# Patient Record
Sex: FEMALE | Race: WHITE | NOT HISPANIC OR LATINO | URBAN - METROPOLITAN AREA
[De-identification: names, ages, dates, MRNs, and addresses within clinical notes are randomized per-mention and may not be internally consistent; named-entity substitution may affect disease eponyms.]

---

## 2020-07-25 ENCOUNTER — TELEPHONE ENCOUNTER (OUTPATIENT)
Dept: URBAN - METROPOLITAN AREA CLINIC 13 | Facility: CLINIC | Age: 20
End: 2020-07-25

## 2020-07-25 RX ORDER — PANCRELIPASE 24000; 76000; 120000 [USP'U]/1; [USP'U]/1; [USP'U]/1
TAKE 2 CAPSULE 3 TIMES DAILY BEFORE MEALS CAPSULE, DELAYED RELEASE PELLETS ORAL
Refills: 3 | OUTPATIENT
Start: 2018-08-01 | End: 2019-11-26

## 2020-07-25 RX ORDER — URSODIOL 300 MG/1
PHARMACY MADE INTO A LIQUID. SHE TAKES 3.5ML TWICE A DAY CAPSULE ORAL
Refills: 0 | OUTPATIENT
Start: 2007-10-16 | End: 2019-03-20

## 2020-07-25 RX ORDER — FLUTICASONE PROPIONATE 220 UG/1
AEROSOL, METERED RESPIRATORY (INHALATION)
Refills: 0 | OUTPATIENT
Start: 2006-05-10 | End: 2019-03-20

## 2020-07-25 RX ORDER — PANCRELIPASE 60000; 12000; 38000 [USP'U]/1; [USP'U]/1; [USP'U]/1
TAKE TWO CAPSULES BY MOUTH THREE TIMES A DAY CAPSULE, DELAYED RELEASE PELLETS ORAL
Refills: 6 | OUTPATIENT
Start: 2018-08-01 | End: 2019-11-26

## 2020-07-26 ENCOUNTER — TELEPHONE ENCOUNTER (OUTPATIENT)
Dept: URBAN - METROPOLITAN AREA CLINIC 13 | Facility: CLINIC | Age: 20
End: 2020-07-26

## 2020-07-26 RX ORDER — CLINDAMYCIN PALMITATE HYDROCHLORIDE (PEDIATRIC) 75 MG/5ML
SOLUTION ORAL
Qty: 600 | Refills: 0 | Status: ACTIVE | COMMUNITY
Start: 2018-04-26

## 2020-07-26 RX ORDER — LINEZOLID 100 MG/5ML
SUSPENSION ORAL
Qty: 600 | Refills: 0 | Status: ACTIVE | COMMUNITY
Start: 2019-05-20

## 2020-07-26 RX ORDER — MUPIROCIN 20 MG/G
OINTMENT TOPICAL
Qty: 22 | Refills: 0 | Status: ACTIVE | COMMUNITY
Start: 2018-07-12

## 2020-07-26 RX ORDER — HYDROCODONE BITARTRATE AND ACETAMINOPHEN 7.5; 325 MG/1; MG/1
TABLET ORAL
Qty: 20 | Refills: 0 | Status: ACTIVE | COMMUNITY
Start: 2019-03-25

## 2020-07-26 RX ORDER — ALBUTEROL SULFATE 90 UG/1
AEROSOL, METERED RESPIRATORY (INHALATION)
Qty: 8 | Refills: 0 | Status: ACTIVE | COMMUNITY
Start: 2018-10-17

## 2020-07-26 RX ORDER — URINE ACETONE TEST STRIPS
STRIP MISCELLANEOUS
Qty: 200 | Refills: 0 | Status: ACTIVE | COMMUNITY
Start: 2019-04-10

## 2020-07-26 RX ORDER — DROSPIRENONE AND ETHINYL ESTRADIOL 0.03MG-3MG
KIT ORAL
Qty: 28 | Refills: 0 | Status: ACTIVE | COMMUNITY
Start: 2019-01-24

## 2020-07-26 RX ORDER — ALBUTEROL SULFATE 2.5 MG/3ML
SOLUTION RESPIRATORY (INHALATION)
Qty: 75 | Refills: 0 | Status: ACTIVE | COMMUNITY
Start: 2018-05-30

## 2020-07-26 RX ORDER — ADAPALENE 3 MG/G
GEL TOPICAL
Qty: 45 | Refills: 0 | Status: ACTIVE | COMMUNITY
Start: 2018-03-28

## 2020-07-26 RX ORDER — CEFDINIR 250 MG/5ML
POWDER, FOR SUSPENSION ORAL
Qty: 120 | Refills: 0 | Status: ACTIVE | COMMUNITY
Start: 2019-01-30

## 2020-07-26 RX ORDER — SODIUM CHLORIDE FOR INHALATION 3.5 %
USE AS DIRECTED VIAL, NEBULIZER (ML) INHALATION
Refills: 0 | Status: ACTIVE | COMMUNITY

## 2020-07-26 RX ORDER — DOXYCYCLINE 100 MG/1
CAPSULE ORAL
Qty: 28 | Refills: 0 | Status: ACTIVE | COMMUNITY
Start: 2018-05-08

## 2020-07-26 RX ORDER — DROSPIRENONE AND ETHINYL ESTRADIOL 0.03MG-3MG
KIT ORAL
Qty: 28 | Refills: 0 | Status: ACTIVE | COMMUNITY
Start: 2018-04-22

## 2020-07-26 RX ORDER — SODIUM CHLORIDE 9 G/1000ML
INJECTION, SOLUTION INTRAVENOUS
Qty: 12000 | Refills: 0 | Status: ACTIVE | COMMUNITY
Start: 2019-05-07

## 2020-07-26 RX ORDER — LEVOFLOXACIN 750 MG/30ML
INJECTION, SOLUTION, CONCENTRATE INTRAVENOUS
Qty: 220 | Refills: 0 | Status: ACTIVE | COMMUNITY
Start: 2019-07-06

## 2020-07-26 RX ORDER — TOBRAMYCIN 300 MG/5ML
USE AS DIRECTED SOLUTION RESPIRATORY (INHALATION)
Refills: 0 | Status: ACTIVE | COMMUNITY
Start: 2018-03-28

## 2020-07-26 RX ORDER — BLOOD SUGAR DIAGNOSTIC
STRIP MISCELLANEOUS
Qty: 300 | Refills: 0 | Status: ACTIVE | COMMUNITY
Start: 2019-04-10

## 2020-07-26 RX ORDER — PANCRELIPASE 36000; 180000; 114000 [USP'U]/1; [USP'U]/1; [USP'U]/1
TAKE 2 CAPSULE BEFORE MEALS CAPSULE, DELAYED RELEASE PELLETS ORAL
Qty: 180 | Refills: 11 | Status: ACTIVE | COMMUNITY
Start: 2019-11-26

## 2020-07-26 RX ORDER — GLUCAGON 1 MG
KIT INJECTION
Qty: 3 | Refills: 0 | Status: ACTIVE | COMMUNITY
Start: 2018-07-31

## 2020-07-26 RX ORDER — LINEZOLID 600 MG/1
TABLET ORAL
Qty: 28 | Refills: 0 | Status: ACTIVE | COMMUNITY
Start: 2018-10-18

## 2020-07-26 RX ORDER — BLOOD SUGAR DIAGNOSTIC
STRIP MISCELLANEOUS
Qty: 150 | Refills: 0 | Status: ACTIVE | COMMUNITY
Start: 2018-05-14

## 2020-07-26 RX ORDER — BUTALBITAL, ACETAMINOPHEN, AND CAFFEINE 50; 325; 40 MG/1; MG/1; MG/1
TABLET ORAL
Qty: 10 | Refills: 0 | Status: ACTIVE | COMMUNITY
Start: 2019-08-13

## 2020-07-26 RX ORDER — HYDROCODONE BITARTRATE AND ACETAMINOPHEN 5; 325 MG/1; MG/1
TABLET ORAL
Qty: 16 | Refills: 0 | Status: ACTIVE | COMMUNITY
Start: 2019-07-23

## 2020-07-26 RX ORDER — ONDANSETRON HYDROCHLORIDE 4 MG/1
TABLET, FILM COATED ORAL
Qty: 9 | Refills: 0 | Status: ACTIVE | COMMUNITY
Start: 2018-07-05

## 2020-07-26 RX ORDER — RIZATRIPTAN BENZOATE 10 MG/1
TABLET, ORALLY DISINTEGRATING ORAL
Qty: 10 | Refills: 0 | Status: ACTIVE | COMMUNITY
Start: 2019-08-13

## 2020-07-26 RX ORDER — CLINDAMYCIN HYDROCHLORIDE 300 MG/1
CAPSULE ORAL
Qty: 28 | Refills: 0 | Status: ACTIVE | COMMUNITY
Start: 2019-01-30

## 2020-07-26 RX ORDER — INSULIN ASPART 100 [IU]/ML
INJECTION, SOLUTION INTRAVENOUS; SUBCUTANEOUS
Qty: 15 | Refills: 0 | Status: ACTIVE | COMMUNITY
Start: 2018-06-15

## 2020-07-26 RX ORDER — SODIUM CHLORIDE SOLN NEBU 7% 7 %
NEBU SOLN INHALATION
Qty: 240 | Refills: 0 | Status: ACTIVE | COMMUNITY
Start: 2019-10-28

## 2020-07-26 RX ORDER — INSULIN GLARGINE 100 [IU]/ML
INJECT SUBCUTANEOUSLY AS DIRECTED INJECTION, SOLUTION SUBCUTANEOUS
Refills: 0 | Status: ACTIVE | COMMUNITY
Start: 2018-06-15

## 2020-07-26 RX ORDER — ALBUTEROL SULFATE 90 UG/1
AEROSOL, METERED RESPIRATORY (INHALATION)
Qty: 8 | Refills: 0 | Status: ACTIVE | COMMUNITY
Start: 2019-10-28

## 2020-07-26 RX ORDER — ONDANSETRON 8 MG/1
TABLET, ORALLY DISINTEGRATING ORAL
Qty: 12 | Refills: 0 | Status: ACTIVE | COMMUNITY
Start: 2019-08-13

## 2020-07-26 RX ORDER — GLUCAGON 1 MG
KIT INJECTION
Qty: 2 | Refills: 0 | Status: ACTIVE | COMMUNITY
Start: 2019-04-10

## 2020-07-26 RX ORDER — INSULIN ASPART 100 [IU]/ML
INJECTION, SOLUTION INTRAVENOUS; SUBCUTANEOUS
Qty: 15 | Refills: 0 | Status: ACTIVE | COMMUNITY
Start: 2019-04-10

## 2020-07-26 RX ORDER — ERGOCALCIFEROL 1.25 MG/1
TAKE 1 CAPSULE WEEKLY FOR 8 WEEKS CAPSULE ORAL
Qty: 8 | Refills: 0 | Status: ACTIVE | COMMUNITY
Start: 2019-12-08

## 2020-07-26 RX ORDER — ACETAMINOPHEN AND CODEINE PHOSPHATE 300; 30 MG/1; MG/1
TABLET ORAL
Qty: 16 | Refills: 0 | Status: ACTIVE | COMMUNITY
Start: 2019-01-30

## 2020-07-26 RX ORDER — INSULIN ASPART 100 [IU]/ML
INJECT 4 UNIT 4 TIMES DAILY INJECTION, SOLUTION INTRAVENOUS; SUBCUTANEOUS
Refills: 0 | Status: ACTIVE | COMMUNITY

## 2020-07-26 RX ORDER — SODIUM CHLORIDE SOLN NEBU 7% 7 %
NEBU SOLN INHALATION
Qty: 240 | Refills: 0 | Status: ACTIVE | COMMUNITY
Start: 2018-08-01

## 2020-07-26 RX ORDER — CLINDAMYCIN PALMITATE HYDROCHLORIDE (PEDIATRIC) 75 MG/5ML
SOLUTION ORAL
Qty: 600 | Refills: 0 | Status: ACTIVE | COMMUNITY
Start: 2019-03-25

## 2020-07-26 RX ORDER — ALBUTEROL SULFATE 2.5 MG/3ML
SOLUTION RESPIRATORY (INHALATION)
Qty: 75 | Refills: 0 | Status: ACTIVE | COMMUNITY
Start: 2019-11-10

## 2020-10-08 ENCOUNTER — OFFICE VISIT (OUTPATIENT)
Dept: URBAN - METROPOLITAN AREA CLINIC 113 | Facility: CLINIC | Age: 20
End: 2020-10-08

## 2020-10-08 RX ORDER — SODIUM CHLORIDE FOR INHALATION 3.5 %
USE AS DIRECTED VIAL, NEBULIZER (ML) INHALATION
Refills: 0 | Status: ACTIVE | COMMUNITY

## 2020-10-08 RX ORDER — INSULIN GLARGINE 100 [IU]/ML
INJECT SUBCUTANEOUSLY AS DIRECTED INJECTION, SOLUTION SUBCUTANEOUS
Refills: 0 | Status: ACTIVE | COMMUNITY
Start: 2018-06-15

## 2020-10-08 RX ORDER — ALBUTEROL SULFATE 2.5 MG/3ML
SOLUTION RESPIRATORY (INHALATION)
Qty: 75 | Refills: 0 | Status: ACTIVE | COMMUNITY
Start: 2018-05-30

## 2020-10-08 RX ORDER — LINEZOLID 600 MG/1
TABLET ORAL
Qty: 28 | Refills: 0 | Status: ACTIVE | COMMUNITY
Start: 2018-10-18

## 2020-10-08 RX ORDER — GLUCAGON 1 MG
KIT INJECTION
Qty: 3 | Refills: 0 | Status: ACTIVE | COMMUNITY
Start: 2018-07-31

## 2020-10-08 RX ORDER — ERGOCALCIFEROL 1.25 MG/1
TAKE 1 CAPSULE WEEKLY FOR 8 WEEKS CAPSULE ORAL
Qty: 8 | Refills: 0 | Status: ACTIVE | COMMUNITY
Start: 2019-12-08

## 2020-10-08 RX ORDER — DROSPIRENONE AND ETHINYL ESTRADIOL 0.03MG-3MG
KIT ORAL
Qty: 28 | Refills: 0 | Status: ACTIVE | COMMUNITY
Start: 2018-04-22

## 2020-10-08 RX ORDER — BUTALBITAL, ACETAMINOPHEN, AND CAFFEINE 50; 325; 40 MG/1; MG/1; MG/1
TABLET ORAL
Qty: 10 | Refills: 0 | Status: ACTIVE | COMMUNITY
Start: 2019-08-13

## 2020-10-08 RX ORDER — DOXYCYCLINE 100 MG/1
CAPSULE ORAL
Qty: 28 | Refills: 0 | Status: ACTIVE | COMMUNITY
Start: 2018-05-08

## 2020-10-08 RX ORDER — ACETAMINOPHEN AND CODEINE PHOSPHATE 300; 30 MG/1; MG/1
TABLET ORAL
Qty: 16 | Refills: 0 | Status: ACTIVE | COMMUNITY
Start: 2019-01-30

## 2020-10-08 RX ORDER — LEVOFLOXACIN 750 MG/30ML
INJECTION, SOLUTION, CONCENTRATE INTRAVENOUS
Qty: 220 | Refills: 0 | Status: ACTIVE | COMMUNITY
Start: 2019-07-06

## 2020-10-08 RX ORDER — ALBUTEROL SULFATE 90 UG/1
AEROSOL, METERED RESPIRATORY (INHALATION)
Qty: 8 | Refills: 0 | Status: ACTIVE | COMMUNITY
Start: 2018-10-17

## 2020-10-08 RX ORDER — SODIUM CHLORIDE 9 G/1000ML
INJECTION, SOLUTION INTRAVENOUS
Qty: 12000 | Refills: 0 | Status: ACTIVE | COMMUNITY
Start: 2019-05-07

## 2020-10-08 RX ORDER — LINEZOLID 100 MG/5ML
SUSPENSION ORAL
Qty: 600 | Refills: 0 | Status: ACTIVE | COMMUNITY
Start: 2019-05-20

## 2020-10-08 RX ORDER — CLINDAMYCIN HYDROCHLORIDE 300 MG/1
CAPSULE ORAL
Qty: 28 | Refills: 0 | Status: ACTIVE | COMMUNITY
Start: 2019-01-30

## 2020-10-08 RX ORDER — HYDROCODONE BITARTRATE AND ACETAMINOPHEN 7.5; 325 MG/1; MG/1
TABLET ORAL
Qty: 20 | Refills: 0 | Status: ACTIVE | COMMUNITY
Start: 2019-03-25

## 2020-10-08 RX ORDER — ADAPALENE 3 MG/G
GEL TOPICAL
Qty: 45 | Refills: 0 | Status: ACTIVE | COMMUNITY
Start: 2018-03-28

## 2020-10-08 RX ORDER — INSULIN ASPART 100 [IU]/ML
INJECT 4 UNIT 4 TIMES DAILY INJECTION, SOLUTION INTRAVENOUS; SUBCUTANEOUS
Refills: 0 | Status: ACTIVE | COMMUNITY

## 2020-10-08 RX ORDER — URINE ACETONE TEST STRIPS
STRIP MISCELLANEOUS
Qty: 200 | Refills: 0 | Status: ACTIVE | COMMUNITY
Start: 2019-04-10

## 2020-10-08 RX ORDER — SODIUM CHLORIDE SOLN NEBU 7% 7 %
NEBU SOLN INHALATION
Qty: 240 | Refills: 0 | Status: ACTIVE | COMMUNITY
Start: 2018-08-01

## 2020-10-08 RX ORDER — CLINDAMYCIN PALMITATE HYDROCHLORIDE (PEDIATRIC) 75 MG/5ML
SOLUTION ORAL
Qty: 600 | Refills: 0 | Status: ACTIVE | COMMUNITY
Start: 2018-04-26

## 2020-10-08 RX ORDER — MUPIROCIN 20 MG/G
OINTMENT TOPICAL
Qty: 22 | Refills: 0 | Status: ACTIVE | COMMUNITY
Start: 2018-07-12

## 2020-10-08 RX ORDER — BLOOD SUGAR DIAGNOSTIC
STRIP MISCELLANEOUS
Qty: 150 | Refills: 0 | Status: ACTIVE | COMMUNITY
Start: 2018-05-14

## 2020-10-08 RX ORDER — INSULIN ASPART 100 [IU]/ML
INJECTION, SOLUTION INTRAVENOUS; SUBCUTANEOUS
Qty: 15 | Refills: 0 | Status: ACTIVE | COMMUNITY
Start: 2018-06-15

## 2020-10-08 RX ORDER — TOBRAMYCIN 300 MG/5ML
USE AS DIRECTED SOLUTION RESPIRATORY (INHALATION)
Refills: 0 | Status: ACTIVE | COMMUNITY
Start: 2018-03-28

## 2020-10-08 RX ORDER — HYDROCODONE BITARTRATE AND ACETAMINOPHEN 5; 325 MG/1; MG/1
TABLET ORAL
Qty: 16 | Refills: 0 | Status: ACTIVE | COMMUNITY
Start: 2019-07-23

## 2020-10-08 RX ORDER — ONDANSETRON 8 MG/1
TABLET, ORALLY DISINTEGRATING ORAL
Qty: 12 | Refills: 0 | Status: ACTIVE | COMMUNITY
Start: 2019-08-13

## 2020-10-08 RX ORDER — RIZATRIPTAN BENZOATE 10 MG/1
TABLET, ORALLY DISINTEGRATING ORAL
Qty: 10 | Refills: 0 | Status: ACTIVE | COMMUNITY
Start: 2019-08-13

## 2020-10-08 RX ORDER — BLOOD SUGAR DIAGNOSTIC
STRIP MISCELLANEOUS
Qty: 300 | Refills: 0 | Status: ACTIVE | COMMUNITY
Start: 2019-04-10

## 2020-10-08 RX ORDER — CEFDINIR 250 MG/5ML
POWDER, FOR SUSPENSION ORAL
Qty: 120 | Refills: 0 | Status: ACTIVE | COMMUNITY
Start: 2019-01-30

## 2020-10-08 RX ORDER — PANCRELIPASE 36000; 180000; 114000 [USP'U]/1; [USP'U]/1; [USP'U]/1
TAKE 2 CAPSULE BEFORE MEALS CAPSULE, DELAYED RELEASE PELLETS ORAL
Qty: 180 | Refills: 11 | Status: ACTIVE | COMMUNITY
Start: 2019-11-26

## 2020-10-08 RX ORDER — ONDANSETRON HYDROCHLORIDE 4 MG/1
TABLET, FILM COATED ORAL
Qty: 9 | Refills: 0 | Status: ACTIVE | COMMUNITY
Start: 2018-07-05

## 2020-12-18 ENCOUNTER — WEB ENCOUNTER (OUTPATIENT)
Dept: URBAN - METROPOLITAN AREA CLINIC 113 | Facility: CLINIC | Age: 20
End: 2020-12-18

## 2020-12-18 ENCOUNTER — OFFICE VISIT (OUTPATIENT)
Dept: URBAN - METROPOLITAN AREA CLINIC 113 | Facility: CLINIC | Age: 20
End: 2020-12-18
Payer: COMMERCIAL

## 2020-12-18 VITALS
RESPIRATION RATE: 18 BRPM | DIASTOLIC BLOOD PRESSURE: 81 MMHG | TEMPERATURE: 96.8 F | HEART RATE: 66 BPM | HEIGHT: 63 IN | WEIGHT: 141 LBS | SYSTOLIC BLOOD PRESSURE: 135 MMHG | BODY MASS INDEX: 24.98 KG/M2

## 2020-12-18 DIAGNOSIS — K59.00 CONSTIPATION, UNSPECIFIED CONSTIPATION TYPE: ICD-10-CM

## 2020-12-18 DIAGNOSIS — K86.89 OTHER SPECIFIED DISEASES OF PANCREAS: ICD-10-CM

## 2020-12-18 DIAGNOSIS — E84.8 CYSTIC FIBROSIS WITH OTHER MANIFESTATIONS: ICD-10-CM

## 2020-12-18 PROCEDURE — G9903 PT SCRN TBCO ID AS NON USER: HCPCS | Performed by: INTERNAL MEDICINE

## 2020-12-18 PROCEDURE — G8482 FLU IMMUNIZE ORDER/ADMIN: HCPCS | Performed by: INTERNAL MEDICINE

## 2020-12-18 PROCEDURE — G8427 DOCREV CUR MEDS BY ELIG CLIN: HCPCS | Performed by: INTERNAL MEDICINE

## 2020-12-18 PROCEDURE — G8420 CALC BMI NORM PARAMETERS: HCPCS | Performed by: INTERNAL MEDICINE

## 2020-12-18 PROCEDURE — 99213 OFFICE O/P EST LOW 20 MIN: CPT | Performed by: INTERNAL MEDICINE

## 2020-12-18 RX ORDER — ERGOCALCIFEROL 1.25 MG/1
TAKE 1 CAPSULE WEEKLY FOR 8 WEEKS CAPSULE ORAL
Qty: 8 | Refills: 0 | Status: ACTIVE | COMMUNITY
Start: 2019-12-08

## 2020-12-18 RX ORDER — SODIUM CHLORIDE 9 G/1000ML
INJECTION, SOLUTION INTRAVENOUS
Qty: 12000 | Refills: 0 | Status: ACTIVE | COMMUNITY
Start: 2019-05-07

## 2020-12-18 RX ORDER — ALBUTEROL SULFATE 2.5 MG/3ML
SOLUTION RESPIRATORY (INHALATION)
Qty: 75 | Refills: 0 | Status: ACTIVE | COMMUNITY
Start: 2018-05-30

## 2020-12-18 RX ORDER — ALBUTEROL SULFATE 90 UG/1
AEROSOL, METERED RESPIRATORY (INHALATION)
Qty: 8 | Refills: 0 | Status: ACTIVE | COMMUNITY
Start: 2018-10-17

## 2020-12-18 RX ORDER — ERGOCALCIFEROL 1.25 MG/1
TAKE 1 CAPSULE WEEKLY FOR 8 WEEKS CAPSULE ORAL
OUTPATIENT
Start: 2019-12-08

## 2020-12-18 RX ORDER — SODIUM CHLORIDE SOLN NEBU 7% 7 %
NEBU SOLN INHALATION
Qty: 240 | Refills: 0 | Status: ACTIVE | COMMUNITY
Start: 2018-08-01

## 2020-12-18 RX ORDER — INSULIN LISPRO 100 [IU]/ML
AS DIRECTED INJECTION, SOLUTION INTRAVENOUS; SUBCUTANEOUS
Status: ACTIVE | COMMUNITY

## 2020-12-18 RX ORDER — URINE ACETONE TEST STRIPS
STRIP MISCELLANEOUS
Qty: 200 | Refills: 0 | Status: ACTIVE | COMMUNITY
Start: 2019-04-10

## 2020-12-18 RX ORDER — GLUCAGON 1 MG
KIT INJECTION
Qty: 3 | Refills: 0 | Status: ACTIVE | COMMUNITY
Start: 2018-07-31

## 2020-12-18 RX ORDER — SODIUM CHLORIDE FOR INHALATION 3.5 %
USE AS DIRECTED VIAL, NEBULIZER (ML) INHALATION
Refills: 0 | Status: ACTIVE | COMMUNITY

## 2020-12-18 RX ORDER — BLOOD SUGAR DIAGNOSTIC
STRIP MISCELLANEOUS
Qty: 150 | Refills: 0 | Status: ACTIVE | COMMUNITY
Start: 2018-05-14

## 2020-12-18 RX ORDER — TOBRAMYCIN 300 MG/5ML
USE AS DIRECTED SOLUTION RESPIRATORY (INHALATION)
Refills: 0 | Status: ACTIVE | COMMUNITY
Start: 2018-03-28

## 2020-12-18 RX ORDER — INSULIN GLARGINE 100 [IU]/ML
INJECT SUBCUTANEOUSLY AS DIRECTED INJECTION, SOLUTION SUBCUTANEOUS
Refills: 0 | Status: ACTIVE | COMMUNITY
Start: 2018-06-15

## 2020-12-18 RX ORDER — BLOOD SUGAR DIAGNOSTIC
STRIP MISCELLANEOUS
Qty: 300 | Refills: 0 | Status: ACTIVE | COMMUNITY
Start: 2019-04-10

## 2020-12-18 RX ORDER — PANCRELIPASE 36000; 180000; 114000 [USP'U]/1; [USP'U]/1; [USP'U]/1
TAKE 2 CAPSULE BEFORE MEALS CAPSULE, DELAYED RELEASE PELLETS ORAL
Qty: 180 | Refills: 11 | Status: ACTIVE | COMMUNITY
Start: 2019-11-26

## 2020-12-18 RX ORDER — PANCRELIPASE 36000; 180000; 114000 [USP'U]/1; [USP'U]/1; [USP'U]/1
TAKE 2 CAPSULE BEFORE MEALS CAPSULE, DELAYED RELEASE PELLETS ORAL
OUTPATIENT
Start: 2019-11-26

## 2020-12-18 NOTE — HPI-TODAY'S VISIT:
Ms. Zhao is a 19-year-old woman with a history of cystic fibrosis (2015) presenting for routine follow-up regarding constipation and pancreatic insufficiency related to cystic fibrosis.  She was last seen on 11/26/19.  At that time her constipation was well controlled taking MiraLAX daily.  She is due for repeat labs including vitamin levels.  She continued her pancreatic enzymes.  She returns for follow-up overall doing well.  She has had no interval issues.  Bowel habits are regular taking MiraLAX daily as needed.  She also continues to take pancreatic enzymes, 36,000 units lipase, 2 capsules with meals.  She typically does not take any pancreatic enzymes with snacks.  Her blood sugars have been well controlled.  She is taking vitamin supplements.  She denies any issues of abdominal pain, red blood per rectum or weight loss.

## 2020-12-23 ENCOUNTER — ERX REFILL RESPONSE (OUTPATIENT)
Age: 20
End: 2020-12-23

## 2020-12-23 RX ORDER — ERGOCALCIFEROL 1.25 MG/1
TAKE 1 CAPSULE WEEKLY FOR 8 WEEKS CAPSULE, LIQUID FILLED ORAL
Qty: 4 | Refills: 2

## 2020-12-23 RX ORDER — VITAMIN A 3000 MCG
TAKE 1 CAPSULE BY MOUTH EVERY DAY CAPSULE ORAL
Qty: 100 | Refills: 1

## 2021-01-12 ENCOUNTER — ERX REFILL RESPONSE (OUTPATIENT)
Age: 21
End: 2021-01-12

## 2021-01-12 RX ORDER — PANCRELIPASE 36000; 180000; 114000 [USP'U]/1; [USP'U]/1; [USP'U]/1
TAKE TWO CAPSULES BY MOUTH THREE TIMES DAILY BEFORE MEALS CAPSULE, DELAYED RELEASE PELLETS ORAL
Qty: 180 | Refills: 10

## 2021-03-13 ENCOUNTER — ERX REFILL RESPONSE (OUTPATIENT)
Dept: URBAN - METROPOLITAN AREA CLINIC 113 | Facility: CLINIC | Age: 21
End: 2021-03-13

## 2021-03-13 RX ORDER — ERGOCALCIFEROL 1.25 MG/1
TAKE ONE CAPSULE BY MOUTH ONCE A WEEK CAPSULE ORAL
Qty: 4 | Refills: 1

## 2021-03-22 ENCOUNTER — TELEPHONE ENCOUNTER (OUTPATIENT)
Dept: URBAN - METROPOLITAN AREA CLINIC 113 | Facility: CLINIC | Age: 21
End: 2021-03-22

## 2021-03-22 RX ORDER — ERGOCALCIFEROL 1.25 MG/1
TAKE ONE CAPSULE BY MOUTH ONCE A WEEK CAPSULE ORAL ONCE A DAY
Qty: 4 | Refills: 5
End: 2021-09-06

## 2021-04-08 ENCOUNTER — TELEPHONE ENCOUNTER (OUTPATIENT)
Dept: URBAN - METROPOLITAN AREA CLINIC 113 | Facility: CLINIC | Age: 21
End: 2021-04-08

## 2021-04-08 RX ORDER — CHOLECALCIFEROL (VITAMIN D3) 50 MCG
1 TABLET TABLET ORAL ONCE A DAY
Qty: 30 | Refills: 11 | OUTPATIENT
Start: 2021-04-10 | End: 2022-04-05

## 2021-04-08 RX ORDER — VITAMIN A 3000 MCG
TAKE 1 CAPSULE BY MOUTH EVERY DAY CAPSULE ORAL ONCE A DAY
Qty: 30 | Refills: 11
End: 2022-04-05

## 2021-04-08 RX ORDER — PHYTONADIONE 5 MG/1
1 TABLET TABLET ORAL
Qty: 12 | Refills: 3 | OUTPATIENT
Start: 2021-04-10 | End: 2022-04-05

## 2021-06-01 ENCOUNTER — LAB OUTSIDE AN ENCOUNTER (OUTPATIENT)
Dept: URBAN - METROPOLITAN AREA CLINIC 113 | Facility: CLINIC | Age: 21
End: 2021-06-01

## 2021-06-30 ENCOUNTER — TELEPHONE ENCOUNTER (OUTPATIENT)
Dept: URBAN - METROPOLITAN AREA CLINIC 113 | Facility: CLINIC | Age: 21
End: 2021-06-30

## 2021-07-02 PROBLEM — 85670002: Status: ACTIVE | Noted: 2021-06-30

## 2021-10-12 ENCOUNTER — WEB ENCOUNTER (OUTPATIENT)
Dept: URBAN - METROPOLITAN AREA CLINIC 107 | Facility: CLINIC | Age: 21
End: 2021-10-12

## 2021-10-12 ENCOUNTER — OFFICE VISIT (OUTPATIENT)
Dept: URBAN - METROPOLITAN AREA CLINIC 107 | Facility: CLINIC | Age: 21
End: 2021-10-12
Payer: COMMERCIAL

## 2021-10-12 VITALS
HEART RATE: 67 BPM | SYSTOLIC BLOOD PRESSURE: 111 MMHG | WEIGHT: 139 LBS | DIASTOLIC BLOOD PRESSURE: 77 MMHG | HEIGHT: 63 IN | RESPIRATION RATE: 18 BRPM | TEMPERATURE: 98.5 F | BODY MASS INDEX: 24.63 KG/M2

## 2021-10-12 DIAGNOSIS — E56.9 VITAMIN DEFICIENCY: ICD-10-CM

## 2021-10-12 DIAGNOSIS — E55.9 VITAMIN D DEFICIENCY: ICD-10-CM

## 2021-10-12 DIAGNOSIS — E84.8 CYSTIC FIBROSIS WITH OTHER MANIFESTATIONS: ICD-10-CM

## 2021-10-12 DIAGNOSIS — E84.9 CYSTIC FIBROSIS: ICD-10-CM

## 2021-10-12 DIAGNOSIS — E50.9 VITAMIN A DEFICIENCY: ICD-10-CM

## 2021-10-12 DIAGNOSIS — E56.0 VITAMIN E DEFICIENCY: ICD-10-CM

## 2021-10-12 DIAGNOSIS — E56.1 VITAMIN K DEFICIENCY: ICD-10-CM

## 2021-10-12 DIAGNOSIS — K59.00 CONSTIPATION, UNSPECIFIED CONSTIPATION TYPE: ICD-10-CM

## 2021-10-12 PROCEDURE — 99213 OFFICE O/P EST LOW 20 MIN: CPT | Performed by: INTERNAL MEDICINE

## 2021-10-12 RX ORDER — BLOOD SUGAR DIAGNOSTIC
STRIP MISCELLANEOUS
Qty: 300 | Refills: 0 | Status: ON HOLD | COMMUNITY
Start: 2019-04-10

## 2021-10-12 RX ORDER — GLUCAGON 1 MG
KIT INJECTION
Qty: 3 | Refills: 0 | Status: ACTIVE | COMMUNITY
Start: 2018-07-31

## 2021-10-12 RX ORDER — CHOLECALCIFEROL (VITAMIN D3) 50 MCG
1 TABLET TABLET ORAL ONCE A DAY
Qty: 30 | Refills: 11 | Status: ACTIVE | COMMUNITY
Start: 2021-04-10 | End: 2022-04-05

## 2021-10-12 RX ORDER — PHYTONADIONE 5 MG/1
1 TABLET TABLET ORAL
Qty: 12 | Refills: 3 | Status: ACTIVE | COMMUNITY
Start: 2021-04-10 | End: 2022-04-05

## 2021-10-12 RX ORDER — ALBUTEROL SULFATE 2.5 MG/3ML
SOLUTION RESPIRATORY (INHALATION)
Qty: 75 | Refills: 0 | Status: ACTIVE | COMMUNITY
Start: 2018-05-30

## 2021-10-12 RX ORDER — SODIUM CHLORIDE FOR INHALATION 3.5 %
USE AS DIRECTED VIAL, NEBULIZER (ML) INHALATION
Refills: 0 | Status: ACTIVE | COMMUNITY

## 2021-10-12 RX ORDER — VITAMIN A 3000 MCG
TAKE 1 CAPSULE BY MOUTH EVERY DAY CAPSULE ORAL ONCE A DAY
Qty: 30 | Refills: 11
End: 2022-10-12

## 2021-10-12 RX ORDER — PANCRELIPASE 36000; 180000; 114000 [USP'U]/1; [USP'U]/1; [USP'U]/1
TAKE TWO CAPSULES BY MOUTH THREE TIMES DAILY BEFORE MEALS CAPSULE, DELAYED RELEASE PELLETS ORAL
Qty: 250 | Refills: 10

## 2021-10-12 RX ORDER — VITAMIN A 3000 MCG
TAKE 1 CAPSULE BY MOUTH EVERY DAY CAPSULE ORAL ONCE A DAY
Qty: 30 | Refills: 11 | Status: ACTIVE | COMMUNITY
End: 2022-04-05

## 2021-10-12 RX ORDER — PHYTONADIONE 5 MG/1
1 TABLET TABLET ORAL
Qty: 12 | Refills: 3
Start: 2021-04-10 | End: 2022-10-12

## 2021-10-12 RX ORDER — URINE ACETONE TEST STRIPS
STRIP MISCELLANEOUS
Qty: 200 | Refills: 0 | Status: ACTIVE | COMMUNITY
Start: 2019-04-10

## 2021-10-12 RX ORDER — BLOOD SUGAR DIAGNOSTIC
STRIP MISCELLANEOUS
Qty: 150 | Refills: 0 | Status: ON HOLD | COMMUNITY
Start: 2018-05-14

## 2021-10-12 RX ORDER — SODIUM CHLORIDE SOLN NEBU 7% 7 %
NEBU SOLN INHALATION
Qty: 240 | Refills: 0 | Status: ON HOLD | COMMUNITY
Start: 2018-08-01

## 2021-10-12 RX ORDER — INSULIN LISPRO 100 [IU]/ML
AS DIRECTED INJECTION, SOLUTION INTRAVENOUS; SUBCUTANEOUS
Status: ACTIVE | COMMUNITY

## 2021-10-12 RX ORDER — ALBUTEROL SULFATE 90 UG/1
AEROSOL, METERED RESPIRATORY (INHALATION)
Qty: 8 | Refills: 0 | Status: ACTIVE | COMMUNITY
Start: 2018-10-17

## 2021-10-12 RX ORDER — CHOLECALCIFEROL (VITAMIN D3) 50 MCG
1 TABLET TABLET ORAL ONCE A DAY
Qty: 30 | Refills: 11
Start: 2021-04-10 | End: 2022-10-12

## 2021-10-12 RX ORDER — TOBRAMYCIN 300 MG/5ML
USE AS DIRECTED SOLUTION RESPIRATORY (INHALATION)
Refills: 0 | Status: ACTIVE | COMMUNITY
Start: 2018-03-28

## 2021-10-12 RX ORDER — PANCRELIPASE 36000; 180000; 114000 [USP'U]/1; [USP'U]/1; [USP'U]/1
TAKE TWO CAPSULES BY MOUTH THREE TIMES DAILY BEFORE MEALS CAPSULE, DELAYED RELEASE PELLETS ORAL
Qty: 180 | Refills: 10 | Status: ACTIVE | COMMUNITY

## 2021-10-12 RX ORDER — SODIUM CHLORIDE 9 G/1000ML
INJECTION, SOLUTION INTRAVENOUS
Qty: 12000 | Refills: 0 | Status: ACTIVE | COMMUNITY
Start: 2019-05-07

## 2021-10-12 RX ORDER — INSULIN GLARGINE 100 [IU]/ML
INJECT SUBCUTANEOUSLY AS DIRECTED INJECTION, SOLUTION SUBCUTANEOUS
Refills: 0 | Status: ACTIVE | COMMUNITY
Start: 2018-06-15

## 2021-10-12 NOTE — HPI-TODAY'S VISIT:
She was last seen on 12/18/2020 for follow-up regarding cystic fibrosis complications including pancreatic insufficiency, constipation and vitamin deficiencies.  She is recommended to continue Creon 36,000 units capsule, 2 capsules before meals, continue MiraLAX for constipation, and continue vitamin supplements.  Labs to assess fat-soluble vitamins were ordered. Labs on 10/6/2021 show vitamin A 0.30 (normal 0.3-1.2), vitamin D 5.3 (normal 5.5-18), vitamin K 0.78 (normal 0.22-4.88), PT 13.8, INR 1.1, WBC 5.9, hemoglobin 12.9, MCV 88, platelets 319, normal basic metabolic panel, normal liver function tests, vitamin D 25-hydroxy 30.9. She is overall doing well.  She has been compliant with her vitamin supplements, pancreatic enzyme before meals and MiraLAX daily as needed.  She denies any abdominal pain, red blood per rectum, weight loss.  She also denies any nausea, vomiting, heartburn or difficulty swallowing.  She continues to follow-up with endocrinology regarding her insulin, and pulmonary regarding medication options

## 2021-10-12 NOTE — HPI-TODAY'S VISIT:
Ms. Zhao is a 20-year-old woman with a history of cystic fibrosis (2015) presenting for routine follow-up regarding constipation, pancreatic insufficiency and vitamin deficiencies related to cystic fibrosis.

## 2021-11-01 ENCOUNTER — LAB OUTSIDE AN ENCOUNTER (OUTPATIENT)
Dept: URBAN - METROPOLITAN AREA CLINIC 113 | Facility: CLINIC | Age: 21
End: 2021-11-01

## 2022-01-04 ENCOUNTER — OFFICE VISIT (OUTPATIENT)
Dept: URBAN - METROPOLITAN AREA CLINIC 107 | Facility: CLINIC | Age: 22
End: 2022-01-04

## 2022-02-12 ENCOUNTER — TELEPHONE ENCOUNTER (OUTPATIENT)
Dept: URBAN - METROPOLITAN AREA CLINIC 113 | Facility: CLINIC | Age: 22
End: 2022-02-12

## 2022-02-12 RX ORDER — PANCRELIPASE 36000; 180000; 114000 [USP'U]/1; [USP'U]/1; [USP'U]/1
TAKE TWO CAPSULES BY MOUTH THREE TIMES DAILY BEFORE MEALS CAPSULE, DELAYED RELEASE PELLETS ORAL
Qty: 250 | Refills: 10
End: 2023-01-08

## 2022-02-21 ENCOUNTER — TELEPHONE ENCOUNTER (OUTPATIENT)
Dept: URBAN - METROPOLITAN AREA CLINIC 113 | Facility: CLINIC | Age: 22
End: 2022-02-21

## 2022-03-20 ENCOUNTER — ERX REFILL RESPONSE (OUTPATIENT)
Dept: URBAN - METROPOLITAN AREA CLINIC 107 | Facility: CLINIC | Age: 22
End: 2022-03-20

## 2022-03-20 RX ORDER — PHYTONADIONE 5 MG/1
TAKE 1 TABLET BY MOUTH EVERY WEEK TABLET ORAL
Qty: 8 TABLET | Refills: 0 | OUTPATIENT

## 2022-03-20 RX ORDER — PHYTONADIONE 5 MG/1
TAKE 1 TABLET BY MOUTH EVERY WEEK TABLET ORAL
Qty: 8 TABLET | Refills: 2 | OUTPATIENT

## 2022-04-01 ENCOUNTER — OFFICE VISIT (OUTPATIENT)
Dept: URBAN - METROPOLITAN AREA CLINIC 113 | Facility: CLINIC | Age: 22
End: 2022-04-01

## 2022-04-12 ENCOUNTER — OFFICE VISIT (OUTPATIENT)
Dept: URBAN - METROPOLITAN AREA CLINIC 107 | Facility: CLINIC | Age: 22
End: 2022-04-12
Payer: COMMERCIAL

## 2022-04-12 VITALS
WEIGHT: 138 LBS | RESPIRATION RATE: 20 BRPM | TEMPERATURE: 98.5 F | HEIGHT: 63 IN | DIASTOLIC BLOOD PRESSURE: 88 MMHG | BODY MASS INDEX: 24.45 KG/M2 | HEART RATE: 80 BPM | SYSTOLIC BLOOD PRESSURE: 128 MMHG

## 2022-04-12 DIAGNOSIS — E84.8 CYSTIC FIBROSIS WITH OTHER MANIFESTATIONS: ICD-10-CM

## 2022-04-12 DIAGNOSIS — E84.9 CYSTIC FIBROSIS: ICD-10-CM

## 2022-04-12 DIAGNOSIS — E55.9 VITAMIN D DEFICIENCY: ICD-10-CM

## 2022-04-12 DIAGNOSIS — E56.0 VITAMIN E DEFICIENCY: ICD-10-CM

## 2022-04-12 DIAGNOSIS — K59.00 CONSTIPATION, UNSPECIFIED CONSTIPATION TYPE: ICD-10-CM

## 2022-04-12 DIAGNOSIS — E56.1 VITAMIN K DEFICIENCY: ICD-10-CM

## 2022-04-12 DIAGNOSIS — E50.9 VITAMIN A DEFICIENCY: ICD-10-CM

## 2022-04-12 PROCEDURE — 99213 OFFICE O/P EST LOW 20 MIN: CPT | Performed by: NURSE PRACTITIONER

## 2022-04-12 RX ORDER — SODIUM CHLORIDE SOLN NEBU 7% 7 %
NEBU SOLN INHALATION
Qty: 240 | Refills: 0 | Status: ON HOLD | COMMUNITY
Start: 2018-08-01

## 2022-04-12 RX ORDER — CHOLECALCIFEROL (VITAMIN D3) 50 MCG
1 TABLET TABLET ORAL ONCE A DAY
Qty: 30 | Refills: 11 | Status: ACTIVE | COMMUNITY
Start: 2021-04-10 | End: 2022-10-12

## 2022-04-12 RX ORDER — ALBUTEROL SULFATE 2.5 MG/3ML
SOLUTION RESPIRATORY (INHALATION)
Qty: 75 | Refills: 0 | Status: ACTIVE | COMMUNITY
Start: 2018-05-30

## 2022-04-12 RX ORDER — INSULIN LISPRO 100 [IU]/ML
AS DIRECTED INJECTION, SOLUTION INTRAVENOUS; SUBCUTANEOUS
Status: ACTIVE | COMMUNITY

## 2022-04-12 RX ORDER — BLOOD SUGAR DIAGNOSTIC
STRIP MISCELLANEOUS
Qty: 300 | Refills: 0 | Status: ON HOLD | COMMUNITY
Start: 2019-04-10

## 2022-04-12 RX ORDER — TOBRAMYCIN 300 MG/5ML
USE AS DIRECTED SOLUTION RESPIRATORY (INHALATION)
Refills: 0 | Status: ACTIVE | COMMUNITY
Start: 2018-03-28

## 2022-04-12 RX ORDER — PHYTONADIONE 5 MG/1
1 TABLET TABLET ORAL
Qty: 12 | Refills: 3 | Status: ACTIVE | COMMUNITY
Start: 2021-04-10 | End: 2022-10-12

## 2022-04-12 RX ORDER — INSULIN GLARGINE 100 [IU]/ML
INJECT SUBCUTANEOUSLY AS DIRECTED INJECTION, SOLUTION SUBCUTANEOUS
Refills: 0 | Status: ACTIVE | COMMUNITY
Start: 2018-06-15

## 2022-04-12 RX ORDER — GLUCAGON 1 MG
KIT INJECTION
Qty: 3 | Refills: 0 | Status: ACTIVE | COMMUNITY
Start: 2018-07-31

## 2022-04-12 RX ORDER — ALBUTEROL SULFATE 90 UG/1
AEROSOL, METERED RESPIRATORY (INHALATION)
Qty: 8 | Refills: 0 | Status: ACTIVE | COMMUNITY
Start: 2018-10-17

## 2022-04-12 RX ORDER — PHYTONADIONE 5 MG/1
TAKE 1 TABLET BY MOUTH EVERY WEEK TABLET ORAL
Qty: 8 TABLET | Refills: 2 | Status: ACTIVE | COMMUNITY

## 2022-04-12 RX ORDER — PANCRELIPASE 36000; 180000; 114000 [USP'U]/1; [USP'U]/1; [USP'U]/1
TAKE TWO CAPSULES BY MOUTH THREE TIMES DAILY BEFORE MEALS CAPSULE, DELAYED RELEASE PELLETS ORAL
Qty: 250 | Refills: 10 | Status: ACTIVE | COMMUNITY
End: 2023-01-08

## 2022-04-12 RX ORDER — VITAMIN A 3000 MCG
TAKE 1 CAPSULE BY MOUTH EVERY DAY CAPSULE ORAL ONCE A DAY
Qty: 30 | Refills: 11 | Status: ACTIVE | COMMUNITY
End: 2022-10-12

## 2022-04-12 RX ORDER — SODIUM CHLORIDE FOR INHALATION 3.5 %
USE AS DIRECTED VIAL, NEBULIZER (ML) INHALATION
Refills: 0 | Status: ACTIVE | COMMUNITY

## 2022-04-12 RX ORDER — BLOOD SUGAR DIAGNOSTIC
STRIP MISCELLANEOUS
Qty: 150 | Refills: 0 | Status: ON HOLD | COMMUNITY
Start: 2018-05-14

## 2022-04-12 NOTE — HPI-TODAY'S VISIT:
This is a 21-year-old woman with a history of cystic fibrosis (2015) presenting for routine follow-up regarding constipation, pancreatic insufficiency, and vitamin deficiencies related to cystic fibrosis.  She was last seen 10/12/2021 for follow-up regarding the aforementioned problems.  She was doing well overall.  She was compliant with vitamin supplements and pancreatic enzymes before meals.  She was taking MiraLAX daily for constipation.  She denied any abdominal symptoms.  She continued to follow with endocrinology and pulmonology.  She was to continue vitamin supplements and continue daily MiraLAX.  Additional labs were ordered. She states she is doing well.  She takes Creon 2 with meals.  She usually does not take any with snacks.  She denies heartburn, dysphagia, abdominal pain, or any other abdominal symptoms.  She takes MiraLAX as needed typically requiring it once every 2 months.  She is having regular bowel movements.  She continues to follow with pulmonology and endocrinology.  She has a total of 4 physicians appointments today. Labs  3/5/2022 (admitted for pyleonephritis/[facility]):CBC: WBC 7.4, hemoglobin 10.6, MCV 88.8, platelet 240.  BMP normal with exception of chloride 111, glucose 174, calcium 7.6, magnesium 1.5.  Albumin 2.9.  LFTs: TB 0.5, ALP 69, ALT 18, AST 26.   10/6/2021: Vitamin A normal.  Vitamin E: a Tochopherol slightly low at 5.3 and g Tochopherol normal at 0.5.  Vitamin K 0.78.  PT 13.8, INR 1.1.  CBC: WBC 5.99, hemoglobin 12.9, MCV 88, platelet 319.  BMP normal with exception of glucose 181.  LFTs normal TB 0.5, ALP 80, ALT 16, AST 12.  Vitamin D 25 hydroxy 30.9.

## 2022-04-14 PROBLEM — 14760008: Status: ACTIVE | Noted: 2020-12-18

## 2022-07-26 ENCOUNTER — TELEPHONE ENCOUNTER (OUTPATIENT)
Dept: URBAN - METROPOLITAN AREA CLINIC 113 | Facility: CLINIC | Age: 22
End: 2022-07-26

## 2022-07-26 RX ORDER — PANCRELIPASE 36000; 180000; 114000 [USP'U]/1; [USP'U]/1; [USP'U]/1
TAKE TWO CAPSULES BY MOUTH THREE TIMES DAILY BEFORE MEALS CAPSULE, DELAYED RELEASE PELLETS ORAL
Qty: 250 | Refills: 5

## 2022-07-26 RX ORDER — PHYTONADIONE 5 MG/1
1 TABLET TABLET ORAL
Qty: 12 | Refills: 3

## 2022-07-26 RX ORDER — PHYTONADIONE 5 MG/1
1 TABLET TABLET ORAL
Qty: 12 | Refills: 5
Start: 2021-04-10 | End: 2024-01-16

## 2022-07-26 RX ORDER — VITAMIN A 3000 MCG
TAKE 1 CAPSULE BY MOUTH EVERY DAY CAPSULE ORAL ONCE A DAY
Qty: 30 | Refills: 5

## 2022-07-26 RX ORDER — CHOLECALCIFEROL (VITAMIN D3) 50 MCG
1 TABLET TABLET ORAL ONCE A DAY
Qty: 30 | Refills: 5
Start: 2021-04-10 | End: 2023-01-21

## 2022-08-20 ENCOUNTER — ERX REFILL RESPONSE (OUTPATIENT)
Dept: URBAN - METROPOLITAN AREA CLINIC 107 | Facility: CLINIC | Age: 22
End: 2022-08-20

## 2022-08-20 RX ORDER — PHYTONADIONE 5 MG/1
1 TABLET TABLET ORAL
Qty: 12 | Refills: 3 | OUTPATIENT

## 2022-09-14 ENCOUNTER — TELEPHONE ENCOUNTER (OUTPATIENT)
Dept: URBAN - METROPOLITAN AREA CLINIC 113 | Facility: CLINIC | Age: 22
End: 2022-09-14

## 2022-09-15 ENCOUNTER — CLAIMS CREATED FROM THE CLAIM WINDOW (OUTPATIENT)
Dept: URBAN - METROPOLITAN AREA MEDICAL CENTER 2 | Facility: MEDICAL CENTER | Age: 22
End: 2022-09-15
Payer: COMMERCIAL

## 2022-09-15 DIAGNOSIS — K59.09 CHANGE IN BOWEL MOVEMENTS INTERMITTENT CONSTIPATION. URGENCY IN THE MORNING.: ICD-10-CM

## 2022-09-15 DIAGNOSIS — R93.5 ABNORMAL FINDINGS ON DIAGNOSTIC IMAGING OF OTHER ABDOMINAL REGIONS, INCLUDING RETROPERITONEUM: ICD-10-CM

## 2022-09-15 DIAGNOSIS — R10.84 ABDOMINAL CRAMPING, GENERALIZED: ICD-10-CM

## 2022-09-15 DIAGNOSIS — E84.9 CYSTIC FIBROSIS, UNSPECIFIED: ICD-10-CM

## 2022-09-15 DIAGNOSIS — R11.2 ACUTE NAUSEA WITH NONBILIOUS VOMITING: ICD-10-CM

## 2022-09-15 PROCEDURE — 99253 IP/OBS CNSLTJ NEW/EST LOW 45: CPT | Performed by: INTERNAL MEDICINE

## 2022-09-16 ENCOUNTER — CLAIMS CREATED FROM THE CLAIM WINDOW (OUTPATIENT)
Dept: URBAN - METROPOLITAN AREA MEDICAL CENTER 2 | Facility: MEDICAL CENTER | Age: 22
End: 2022-09-16
Payer: COMMERCIAL

## 2022-09-16 DIAGNOSIS — E84.9 CYSTIC FIBROSIS, UNSPECIFIED: ICD-10-CM

## 2022-09-16 DIAGNOSIS — R93.5 ABNORMAL FINDINGS ON DIAGNOSTIC IMAGING OF OTHER ABDOMINAL REGIONS, INCLUDING RETROPERITONEUM: ICD-10-CM

## 2022-09-16 DIAGNOSIS — K59.09 CHANGE IN BOWEL MOVEMENTS INTERMITTENT CONSTIPATION. URGENCY IN THE MORNING.: ICD-10-CM

## 2022-09-16 PROCEDURE — 99232 SBSQ HOSP IP/OBS MODERATE 35: CPT | Performed by: INTERNAL MEDICINE

## 2022-09-18 PROBLEM — 190905008: Status: ACTIVE | Noted: 2020-12-18

## 2022-10-17 ENCOUNTER — OFFICE VISIT (OUTPATIENT)
Dept: URBAN - METROPOLITAN AREA CLINIC 107 | Facility: CLINIC | Age: 22
End: 2022-10-17
Payer: COMMERCIAL

## 2022-10-17 VITALS
DIASTOLIC BLOOD PRESSURE: 87 MMHG | HEIGHT: 63 IN | HEART RATE: 66 BPM | SYSTOLIC BLOOD PRESSURE: 145 MMHG | BODY MASS INDEX: 25.16 KG/M2 | WEIGHT: 142 LBS | TEMPERATURE: 98.4 F

## 2022-10-17 DIAGNOSIS — E56.0 VITAMIN E DEFICIENCY: ICD-10-CM

## 2022-10-17 DIAGNOSIS — K59.09 CHRONIC CONSTIPATION: ICD-10-CM

## 2022-10-17 DIAGNOSIS — E84.8 CYSTIC FIBROSIS WITH OTHER MANIFESTATIONS: ICD-10-CM

## 2022-10-17 DIAGNOSIS — E56.1 VITAMIN K DEFICIENCY: ICD-10-CM

## 2022-10-17 DIAGNOSIS — E50.9 VITAMIN A DEFICIENCY: ICD-10-CM

## 2022-10-17 DIAGNOSIS — E55.9 VITAMIN D DEFICIENCY: ICD-10-CM

## 2022-10-17 PROCEDURE — 99213 OFFICE O/P EST LOW 20 MIN: CPT | Performed by: INTERNAL MEDICINE

## 2022-10-17 RX ORDER — CHOLECALCIFEROL (VITAMIN D3) 50 MCG
1 CAPSULE CAPSULE ORAL ONCE A DAY
OUTPATIENT

## 2022-10-17 RX ORDER — CHOLECALCIFEROL (VITAMIN D3) 50 MCG
1 CAPSULE CAPSULE ORAL ONCE A DAY
Status: ACTIVE | COMMUNITY

## 2022-10-17 RX ORDER — SODIUM CHLORIDE 7 %
4 ML NEBU SOLN,3 % NEBU TWICE A DAY
Status: ACTIVE | COMMUNITY

## 2022-10-17 RX ORDER — VITAMIN A 10000 UNIT
1 TABLET TABLET ORAL ONCE A DAY
Status: ACTIVE | COMMUNITY

## 2022-10-17 RX ORDER — PHYTONADIONE 5 MG/1
1 TABLET TABLET ORAL
OUTPATIENT

## 2022-10-17 RX ORDER — TOBRAMYCIN 300 MG/5ML
5 ML SOLUTION RESPIRATORY (INHALATION) TWICE A DAY
Status: ACTIVE | COMMUNITY

## 2022-10-17 RX ORDER — VITAMIN A 10000 UNIT
1 TABLET TABLET ORAL ONCE A DAY
OUTPATIENT

## 2022-10-17 RX ORDER — PANCRELIPASE 36000; 180000; 114000 [USP'U]/1; [USP'U]/1; [USP'U]/1
AS DIRECTED CAPSULE, DELAYED RELEASE PELLETS ORAL
Status: ACTIVE | COMMUNITY

## 2022-10-17 RX ORDER — INSULIN LISPRO 100 [IU]/ML
DEXCOM INJECTION, SOLUTION INTRAVENOUS; SUBCUTANEOUS CONTINUOUS
Status: ACTIVE | COMMUNITY

## 2022-10-17 RX ORDER — PHYTONADIONE 5 MG/1
1 TABLET TABLET ORAL
Status: ACTIVE | COMMUNITY

## 2022-10-17 RX ORDER — PANCRELIPASE 36000; 180000; 114000 [USP'U]/1; [USP'U]/1; [USP'U]/1
AS DIRECTED CAPSULE, DELAYED RELEASE PELLETS ORAL
OUTPATIENT

## 2022-10-17 RX ORDER — VITAMIN E (DL,TOCOPHERYL ACET) 90 MG
1 CAPSULE CAPSULE ORAL ONCE A DAY
Status: ACTIVE | COMMUNITY

## 2022-10-17 RX ORDER — ALBUTEROL SULFATE 2.5 MG/3ML
3 ML SOLUTION RESPIRATORY (INHALATION) BID
Status: ACTIVE | COMMUNITY

## 2022-10-17 RX ORDER — VITAMIN E (DL,TOCOPHERYL ACET) 90 MG
1 CAPSULE CAPSULE ORAL ONCE A DAY
OUTPATIENT

## 2022-10-17 NOTE — HPI-OTHER HISTORIES
Labs (3/5/2022, admitted for pyleonephritis/[facility]):CBC: WBC 7.4, hemoglobin 10.6, MCV 88.8, platelet 240.  BMP normal with exception of chloride 111, glucose 174, calcium 7.6, magnesium 1.5.  Albumin 2.9.  LFTs: TB 0.5, ALP 69, ALT 18, AST 26.   Labs (10/6/2021): Vitamin A normal.  Vitamin E: a Tochopherol slightly low at 5.3 and g Tochopherol normal at 0.5.  Vitamin K 0.78.  PT 13.8, INR 1.1.  CBC: WBC 5.99, hemoglobin 12.9, MCV 88, platelet 319.  BMP normal with exception of glucose 181.  LFTs normal TB 0.5, ALP 80, ALT 16, AST 12.  Vitamin D 25 hydroxy 30.9.

## 2022-10-17 NOTE — HPI-TODAY'S VISIT:
Ms. Zhao is a 21-year-old woman with a history of cystic fibrosis (2015) presenting for follow-up regarding constipation, pancreatic insufficiency, and vitamin deficiencies related to cystic fibrosis and after hospitalization at [facility] for abdominal pain attributed to constipation.  She was seen in consultation at [facility] on 9/15/2022 for abdominal pain and constipation.  A CT abdomen pelvis with contrast reported a dilated appendix with minimal stranding adjacent fat possibly secondary to early/mild appendicitis, and severe constipation, severe fatty infiltration of the pancreas.  She was seen by surgery, but they did not feel that she had acute appendicitis.  Her symptoms improved with relief of her constipation.  She returns for follow-up reporting that her bowel movements are fairly regular using MiraLAX 1 capful daily.  No red blood per rectum.  She denies any abdominal pain.  No nausea, vomiting, heartburn or difficulty swallowing.  She remains compliant with her prescribed vitamins supplements.  She reports that labs ordered prior to today's visit were complete pleated last week.  The results not available for review.  Blood sugars are typically in the low 100s.  She is planning to follow-up with cystic fibrosis clinic in [place]

## 2022-10-29 ENCOUNTER — ERX REFILL RESPONSE (OUTPATIENT)
Dept: URBAN - METROPOLITAN AREA CLINIC 107 | Facility: CLINIC | Age: 22
End: 2022-10-29

## 2022-10-29 RX ORDER — PANCRELIPASE 36000; 180000; 114000 [USP'U]/1; [USP'U]/1; [USP'U]/1
AS DIRECTED CAPSULE, DELAYED RELEASE PELLETS ORAL
OUTPATIENT

## 2022-10-29 RX ORDER — PANCRELIPASE 36000; 180000; 114000 [USP'U]/1; [USP'U]/1; [USP'U]/1
TAKE 2 CAPSULES BY MOUTH WITH EACH MEAL AND 1 CAPSULE BY MOUTH WITH EACH SNACK CAPSULE, DELAYED RELEASE PELLETS ORAL
Qty: 240 CAPSULE | Refills: 5 | OUTPATIENT

## 2022-11-03 PROBLEM — 236069009: Status: ACTIVE | Noted: 2022-11-03

## 2022-11-03 PROBLEM — 52675005: Status: ACTIVE | Noted: 2021-04-10

## 2023-02-06 ENCOUNTER — TELEPHONE ENCOUNTER (OUTPATIENT)
Dept: URBAN - METROPOLITAN AREA CLINIC 113 | Facility: CLINIC | Age: 23
End: 2023-02-06

## 2023-02-06 PROBLEM — 72000004: Status: ACTIVE | Noted: 2021-04-10

## 2023-02-06 PROBLEM — 54137008: Status: ACTIVE | Noted: 2021-04-10

## 2023-02-06 PROBLEM — 707450006: Status: ACTIVE | Noted: 2020-12-18

## 2023-02-06 PROBLEM — 34713006: Status: ACTIVE | Noted: 2021-03-22

## 2023-03-24 ENCOUNTER — ERX REFILL RESPONSE (OUTPATIENT)
Dept: URBAN - METROPOLITAN AREA CLINIC 113 | Facility: CLINIC | Age: 23
End: 2023-03-24

## 2023-03-24 RX ORDER — CHOLECALCIFEROL TAB 50 MCG (2000 UNIT) 50 MCG
TAKE ONE TABLET BY MOUTH ONCE DAILY TAB ORAL
Qty: 30 TABLET | Refills: 5 | OUTPATIENT

## 2023-03-24 RX ORDER — CHOLECALCIFEROL TAB 50 MCG (2000 UNIT) 50 MCG
TAKE ONE TABLET BY MOUTH ONCE DAILY TAB ORAL
Qty: 30 TABLET | Refills: 6 | OUTPATIENT

## 2023-04-03 ENCOUNTER — LAB OUTSIDE AN ENCOUNTER (OUTPATIENT)
Dept: URBAN - METROPOLITAN AREA CLINIC 113 | Facility: CLINIC | Age: 23
End: 2023-04-03

## 2023-04-12 ENCOUNTER — DASHBOARD ENCOUNTERS (OUTPATIENT)
Age: 23
End: 2023-04-12

## 2023-04-12 ENCOUNTER — OFFICE VISIT (OUTPATIENT)
Dept: URBAN - METROPOLITAN AREA CLINIC 107 | Facility: CLINIC | Age: 23
End: 2023-04-12
Payer: COMMERCIAL

## 2023-04-12 VITALS
HEIGHT: 63 IN | TEMPERATURE: 98 F | SYSTOLIC BLOOD PRESSURE: 138 MMHG | RESPIRATION RATE: 18 BRPM | DIASTOLIC BLOOD PRESSURE: 86 MMHG | WEIGHT: 144 LBS | HEART RATE: 81 BPM | BODY MASS INDEX: 25.52 KG/M2

## 2023-04-12 DIAGNOSIS — K59.09 CHRONIC CONSTIPATION: ICD-10-CM

## 2023-04-12 DIAGNOSIS — E84.8 CYSTIC FIBROSIS WITH OTHER MANIFESTATIONS: ICD-10-CM

## 2023-04-12 DIAGNOSIS — E50.9 VITAMIN A DEFICIENCY: ICD-10-CM

## 2023-04-12 DIAGNOSIS — E56.1 VITAMIN K DEFICIENCY: ICD-10-CM

## 2023-04-12 DIAGNOSIS — E55.9 VITAMIN D DEFICIENCY: ICD-10-CM

## 2023-04-12 DIAGNOSIS — E56.0 VITAMIN E DEFICIENCY: ICD-10-CM

## 2023-04-12 PROCEDURE — 99213 OFFICE O/P EST LOW 20 MIN: CPT | Performed by: INTERNAL MEDICINE

## 2023-04-12 RX ORDER — PANCRELIPASE 36000; 180000; 114000 [USP'U]/1; [USP'U]/1; [USP'U]/1
TAKE 2 CAPSULES BY MOUTH WITH EACH MEAL AND 1 CAPSULE BY MOUTH WITH EACH SNACK CAPSULE, DELAYED RELEASE PELLETS ORAL
Qty: 240 CAPSULE | Refills: 5 | Status: ACTIVE | COMMUNITY

## 2023-04-12 RX ORDER — VITAMIN E (DL,TOCOPHERYL ACET) 90 MG
1 CAPSULE CAPSULE ORAL ONCE A DAY
Status: ON HOLD | COMMUNITY

## 2023-04-12 RX ORDER — SODIUM CHLORIDE 7 %
4 ML NEBU SOLN,3 % NEBU TWICE A DAY
Status: ACTIVE | COMMUNITY

## 2023-04-12 RX ORDER — PANCRELIPASE 36000; 180000; 114000 [USP'U]/1; [USP'U]/1; [USP'U]/1
TAKE 2 CAPSULES BY MOUTH WITH EACH MEAL AND 1 CAPSULE BY MOUTH WITH EACH SNACK CAPSULE, DELAYED RELEASE PELLETS ORAL
OUTPATIENT

## 2023-04-12 RX ORDER — VITAMIN E (DL,TOCOPHERYL ACET) 90 MG
1 CAPSULE CAPSULE ORAL ONCE A DAY
OUTPATIENT

## 2023-04-12 RX ORDER — ASCORBIC ACID 125 MG
AS DIRECTED TABLET,CHEWABLE ORAL
Status: ACTIVE | COMMUNITY

## 2023-04-12 RX ORDER — ALBUTEROL SULFATE 2.5 MG/3ML
3 ML SOLUTION RESPIRATORY (INHALATION) BID
Status: ACTIVE | COMMUNITY

## 2023-04-12 RX ORDER — VITAMIN A 10000 UNIT
1 TABLET TABLET ORAL ONCE A DAY
OUTPATIENT

## 2023-04-12 RX ORDER — CHOLECALCIFEROL (VITAMIN D3) 50 MCG
1 CAPSULE CAPSULE ORAL ONCE A DAY
Status: ON HOLD | COMMUNITY

## 2023-04-12 RX ORDER — VITAMIN A 10000 UNIT
1 TABLET TABLET ORAL ONCE A DAY
Status: ON HOLD | COMMUNITY

## 2023-04-12 RX ORDER — INSULIN LISPRO 100 [IU]/ML
DEXCOM INJECTION, SOLUTION INTRAVENOUS; SUBCUTANEOUS CONTINUOUS
Status: ACTIVE | COMMUNITY

## 2023-04-12 RX ORDER — CHOLECALCIFEROL (VITAMIN D3) 50 MCG
1 CAPSULE CAPSULE ORAL ONCE A DAY
OUTPATIENT

## 2023-04-12 RX ORDER — PHYTONADIONE 5 MG/1
1 TABLET TABLET ORAL
Status: ACTIVE | COMMUNITY

## 2023-04-12 RX ORDER — TOBRAMYCIN 300 MG/5ML
5 ML SOLUTION RESPIRATORY (INHALATION) TWICE A DAY
Status: ACTIVE | COMMUNITY

## 2023-04-12 RX ORDER — PHYTONADIONE 5 MG/1
1 TABLET TABLET ORAL
OUTPATIENT

## 2023-04-12 RX ORDER — CHOLECALCIFEROL TAB 50 MCG (2000 UNIT) 50 MCG
TAKE ONE TABLET BY MOUTH ONCE DAILY TAB ORAL
Qty: 30 TABLET | Refills: 5 | Status: ON HOLD | COMMUNITY

## 2023-06-05 ENCOUNTER — ERX REFILL RESPONSE (OUTPATIENT)
Dept: URBAN - METROPOLITAN AREA CLINIC 113 | Facility: CLINIC | Age: 23
End: 2023-06-05

## 2023-06-05 RX ORDER — CHOLECALCIFEROL TAB 50 MCG (2000 UNIT) 50 MCG
TAKE ONE TABLET BY MOUTH ONCE DAILY TAB ORAL
Qty: 30 TABLET | Refills: 5 | OUTPATIENT

## 2023-07-10 ENCOUNTER — ERX REFILL RESPONSE (OUTPATIENT)
Dept: URBAN - METROPOLITAN AREA CLINIC 107 | Facility: CLINIC | Age: 23
End: 2023-07-10

## 2023-07-10 RX ORDER — PANCRELIPASE 36000; 180000; 114000 [USP'U]/1; [USP'U]/1; [USP'U]/1
TAKE 2 CAPSULES BY MOUTH WITH EACH MEAL AND 1 CAPSULE BY MOUTH WITH EACH SNACK CAPSULE, DELAYED RELEASE PELLETS ORAL
Qty: 240 | Refills: 11 | OUTPATIENT

## 2023-07-10 RX ORDER — PANCRELIPASE 36000; 180000; 114000 [USP'U]/1; [USP'U]/1; [USP'U]/1
TAKE 2 CAPSULES BY MOUTH WITH EACH MEAL AND 1 CAPSULE BY MOUTH WITH EACH SNACK CAPSULE, DELAYED RELEASE PELLETS ORAL
OUTPATIENT